# Patient Record
Sex: FEMALE | Race: WHITE | NOT HISPANIC OR LATINO | Employment: PART TIME | URBAN - METROPOLITAN AREA
[De-identification: names, ages, dates, MRNs, and addresses within clinical notes are randomized per-mention and may not be internally consistent; named-entity substitution may affect disease eponyms.]

---

## 2017-07-26 ENCOUNTER — ANESTHESIA - HEALTHEAST (OUTPATIENT)
Dept: OBGYN | Facility: CLINIC | Age: 27
End: 2017-07-26

## 2017-07-27 ENCOUNTER — HOME CARE/HOSPICE - HEALTHEAST (OUTPATIENT)
Dept: HOME HEALTH SERVICES | Facility: HOME HEALTH | Age: 27
End: 2017-07-27

## 2020-08-06 LAB
RPR - HISTORICAL: NORMAL
RUBELLA_EXT (HISTORICAL CONVERSION): NORMAL

## 2020-08-08 LAB
HBSAG_EXT (HISTORICAL CONVERSION): NEGATIVE
HGB_EXT (HISTORICAL CONVERSION): 12.1
HGB_EXT (HISTORICAL CONVERSION): 12.1
HIV_EXT: NEGATIVE
PLT_EXT - HISTORICAL: 220
RPR - HISTORICAL: NORMAL
RUBELLA_EXT (HISTORICAL CONVERSION): NORMAL

## 2021-01-25 LAB — GBS_EXT (HISTORICAL CONVERSION): NEGATIVE

## 2021-02-19 ENCOUNTER — AMBULATORY - HEALTHEAST (OUTPATIENT)
Dept: OBGYN | Facility: CLINIC | Age: 31
End: 2021-02-19

## 2021-02-19 DIAGNOSIS — O48.0 POST TERM PREGNANCY, ANTEPARTUM CONDITION OR COMPLICATION: ICD-10-CM

## 2021-02-19 DIAGNOSIS — O48.0 POST TERM PREGNANCY, ANTEPARTUM CONDITION OR COMPLICATION: Primary | ICD-10-CM

## 2021-02-19 LAB
SARS-COV-2 RNA RESP QL NAA+PROBE: NORMAL
SPECIMEN SOURCE: NORMAL

## 2021-02-19 PROCEDURE — U0003 INFECTIOUS AGENT DETECTION BY NUCLEIC ACID (DNA OR RNA); SEVERE ACUTE RESPIRATORY SYNDROME CORONAVIRUS 2 (SARS-COV-2) (CORONAVIRUS DISEASE [COVID-19]), AMPLIFIED PROBE TECHNIQUE, MAKING USE OF HIGH THROUGHPUT TECHNOLOGIES AS DESCRIBED BY CMS-2020-01-R: HCPCS | Performed by: OBSTETRICS & GYNECOLOGY

## 2021-02-19 PROCEDURE — U0005 INFEC AGEN DETEC AMPLI PROBE: HCPCS | Performed by: OBSTETRICS & GYNECOLOGY

## 2021-02-20 LAB
LABORATORY COMMENT REPORT: NORMAL
SARS-COV-2 RNA RESP QL NAA+PROBE: NEGATIVE
SPECIMEN SOURCE: NORMAL

## 2021-02-23 ENCOUNTER — ANESTHESIA - HEALTHEAST (OUTPATIENT)
Dept: OBGYN | Facility: CLINIC | Age: 31
End: 2021-02-23

## 2021-02-23 ENCOUNTER — HOSPITAL ENCOUNTER (INPATIENT)
Dept: OBGYN | Facility: CLINIC | Age: 31
Discharge: HOME | End: 2021-02-24
Attending: OBSTETRICS & GYNECOLOGY | Admitting: OBSTETRICS & GYNECOLOGY
Payer: COMMERCIAL

## 2021-02-23 LAB — HGB_EXT (HISTORICAL CONVERSION): 11.2

## 2021-02-23 ASSESSMENT — MIFFLIN-ST. JEOR: SCORE: 1782.76

## 2021-02-24 LAB
ABO/RH(D): NORMAL
COMPONENT (HISTORICAL CONVERSION): NORMAL
FBS KIT EXPIRATION DATE: NORMAL
FBS KIT LOT #: NORMAL
FETAL BLEED SCREEN: NORMAL
LOT NUMBER (HISTORICAL CONVERSION): NORMAL
STATUS (HISTORICAL CONVERSION): NORMAL
T PALLIDUM AB SER QL: NEGATIVE

## 2021-05-29 ENCOUNTER — HEALTH MAINTENANCE LETTER (OUTPATIENT)
Age: 31
End: 2021-05-29

## 2021-06-05 VITALS — HEIGHT: 67 IN | WEIGHT: 226 LBS | BODY MASS INDEX: 35.47 KG/M2

## 2021-06-12 NOTE — ANESTHESIA POSTPROCEDURE EVALUATION
Patient: Wendie Cunningham  * No procedures listed *  Anesthesia type: epidural    Patient location: Labor and Delivery  Last vitals:   Vitals:    07/27/17 0605   BP: 110/60   Pulse: 67   Resp:    Temp:    SpO2:      Post vital signs: stable  Level of consciousness: awake and responds to simple questions  Post-anesthesia pain: pain controlled  Post-anesthesia nausea and vomiting: no  Pulmonary: unassisted, return to baseline  Cardiovascular: stable and blood pressure at baseline  Hydration: adequate  Anesthetic events: no    QCDR Measures:  ASA# 11 - Christiane-op Cardiac Arrest: ASA11B - Patient did NOT experience unanticipated cardiac arrest  ASA# 12 - Christiane-op Mortality Rate: ASA12B - Patient did NOT die  ASA# 13 - PACU Re-Intubation Rate: NA - No ETT / LMA used for case  ASA# 10 - Composite Anes Safety: ASA10A - No serious adverse event  ASA# 38 - New Corneal Injury: ASA38A - No new exposure keratitis or corneal abrasion in PACU    Additional Notes:

## 2021-06-12 NOTE — ANESTHESIA PREPROCEDURE EVALUATION
Anesthesia Evaluation      Patient summary reviewed   No history of anesthetic complications     Airway   Mallampati: I  Neck ROM: full   Pulmonary - normal exam   (+) asthma  mild,  well controlled,                          Cardiovascular - negative ROS and normal exam  Exercise tolerance: > or = 10 METS  Rhythm: regular  Rate: normal,         Neuro/Psych - negative ROS     Endo/Other    (+) pregnant     GI/Hepatic/Renal - negative ROS           Dental - normal exam                        Anesthesia Plan  Planned anesthetic: epidural    ASA 2     Anesthetic plan and risks discussed with: patient and significant other    Post-op plan: routine recovery

## 2021-06-12 NOTE — ANESTHESIA PROCEDURE NOTES
Epidural Block    Patient location during procedure: OB  Time Called: 7/26/2017 4:32 PM  Reason for Block:labor epidural  Staffing:  Performing  Anesthesiologist: ANAHI GREWAL  Preanesthetic Checklist  Completed: patient identified, risks, benefits, and alternatives discussed, timeout performed, consent obtained, airway assessed, oxygen available, suction available, emergency drugs available and hand hygiene performed  Procedure  Patient position: right lateral decubitus  Prep: ChloraPrep  Patient monitoring: continuous pulse oximetry, heart rate and blood pressure  Approach: midline  Location: L1-L2  Injection technique: RUBY saline  Number of Attempts:1  Needle  Needle type: Joanne   Needle gauge: 18 G     Catheter in Space: 4  Assessment  Sensory level: T8  No complications

## 2021-06-15 NOTE — PLAN OF CARE
Problem: Pain  Goal: Patient's pain/discomfort is manageable  Outcome: Progressing     Problem: Vaginal Delivery - Recovery and Postpartum  Goal: Perineum intact without discharge or hematoma  Outcome: Progressing     Problem: Vaginal Delivery - Recovery and Postpartum  Goal: Patient vitals and physical assessment findings are stable following delivery  Outcome: Progressing     Problem: Vaginal Delivery - Recovery and Postpartum  Goal: Ambulates independently  Outcome: Progressing     Problem: Vaginal Delivery - Recovery and Postpartum  Goal: Empties bladder  Outcome: Progressing     Problem: Vaginal Delivery - Recovery and Postpartum  Goal: Patient demonstrates understanding of perineal care  Outcome: Progressing     Problem: Breast Feeding  Goal: Effective breast feeding established  Outcome: Progressing     Vital signs stable. Fundus firm, midline, U/1 with scant to light bleeding. Pain well managed with toradol. Patient ambulating independently. Up to bathroom, voided, and tubbed. Breastfeeding independently. Continue with current plan of care.

## 2021-06-15 NOTE — PROGRESS NOTES
"Labor progress note    S: Was more uncomfortable and received epidural bolus, then felt like pain improved.    O  Vitals: /66   Pulse 80   Temp 98.7  F (37.1  C) (Oral)   Resp 16   Ht 5' 7\" (1.702 m)   Wt (!) 226 lb (102.5 kg)   BMI 35.40 kg/m      SVE: 5.5/80/-1    FHT: Category 1  [place]: Every 2 min    A/P:  Wendie Cunningham is a 30 y.o.  at 39w6d here for elective IOL  -AROM to clear fluid    Anu Carpenter MD     "

## 2021-06-15 NOTE — PROGRESS NOTES
"Labor progress note    S: Epidural being placed.    O  Vitals: /72   Pulse 80   Temp 98.8  F (37.1  C) (Oral)   Resp 16   Ht 5' 7\" (1.702 m)   Wt (!) 226 lb (102.5 kg)   BMI 35.40 kg/m      SVE: /-1    FHT: Not tracing with [place]    A/P:  Wendie KARUNA StreetOctavio is a 30 y.o.  at 39w6d here for elective IOL.  -Pitocin to stop for now given tachysystole  -FSE applied.    Anu Carpenter MD     "

## 2021-06-15 NOTE — PROGRESS NOTES
Vaginal Delivery Postpartum Day 1    Patient Name:  Wendie Cunningham  :      1990  MRN:      [id number]      Assessment:  Normal postpartum course.    Plan:  Continue current care.    Subjective:  The patient feels well:  Voiding without difficulty, lochia normal, tolerating normal diet, and passing flatus.  Pain is well controlled with current medications.  The patient has no emotional concerns.  The baby is well and being fed by breast.    Objective:  Vitals:    21 1529   BP: 125/76   Pulse: 77   Resp: 16   Temp: 98  F (36.7  C)   SpO2: 99%     Patient Vitals for the past 24 hrs:   BP Temp Temp src Pulse Resp SpO2   21 1529 125/76 98  F (36.7  C) Oral 77 16 99 %   21 0745 117/56 98.4  F (36.9  C) Oral 67 16 99 %   21 0335 114/59 98.1  F (36.7  C) Oral 66 14 100 %   21 -- -- -- 92 -- 100 %   21 -- -- -- 84 -- 100 %   21 -- -- -- 79 -- 100 %   21 -- -- -- 81 -- 100 %   21 -- -- -- 86 -- 100 %   21 -- -- -- 85 -- 99 %   21 -- -- -- 96 -- 99 %   21 117/68 -- -- 80 16 --   21 -- -- -- 84 -- 98 %   21 -- -- -- 84 -- 98 %   21 124/59 -- -- 88 16 --   21 -- -- -- 91 -- 98 %   21 123/58 -- -- 82 16 98 %   21 -- -- -- 86 -- 98 %   21 118/54 -- -- 89 16 98 %   21 -- -- -- 89 -- 98 %   21 -- -- -- 81 -- 97 %   21 -- -- -- 82 -- 97 %   21 111/60 -- -- 83 16 97 %   21 -- -- -- 93 -- 93 %   21 -- -- -- 86 -- 94 %   21 130/60 -- -- 85 16 --   21 -- -- -- 86 -- 98 %   21 -- -- -- 90 -- 96 %   21 125/63 -- -- 92 16 --   21 -- -- -- 91 -- 96 %   21 -- -- -- 94 -- 96 %   21 -- -- -- 98 -- 96 %   21 116/59 -- -- 92 18 --   21 -- -- -- 95 -- 96 %   21 123/60 -- -- 90  16 --   21 -- -- -- 100 -- 96 %   21 -- -- -- 96 -- 97 %   21 -- -- -- 88 -- 100 %   21 -- -- -- 88 -- 100 %   21 -- -- -- 94 -- 100 %   21 113/73 -- -- 89 -- --   21 -- -- -- 87 -- 100 %   21 -- -- -- 89 -- 100 %   21 128/58 -- -- 93 16 --   21 -- 98.8  F (37.1  C) -- -- -- --   21 -- -- -- 90 -- 100 %       The amount and color of the lochia is appropriate for the duration of recovery.  The uterine fundus is firm.  Urinary output is adequate.     Prenatal Labs  Result Component Result Previous Result   ABO/Rh External Result O Negative (2016) No Results   ABORh O NEG (2021) O NEG (2017)   Hemoglobin External Result 11.2 (2021) 12.1 (2020)     12.1 (2020)   Rubella External Result Immune (2020) Immune (2020)       Immunization History   Administered Date(s) Administered     Hep B, historic 2003, 2003, 2003     MMR 2003     Tdap 2003, 2017       Provider:  ernesto    Date:  2021  Time:  8:45 PM    Patient Name:  Wendie Cunningham  :  1990  MRN:  [id number]

## 2021-06-15 NOTE — PROGRESS NOTES
[id number] working on getting belinda to trace FHT... was getting cx well so pit was decreased in half due to not break between cxs ... when Javi was in the room AROM she said pit could be adj as needed... while trying to trace FHT us FHT in 140s

## 2021-06-15 NOTE — PROGRESS NOTES
Outreach Note for [facility]    Wendie Cunningham  [id number]  1990    Discharge follow-up plan discussed with patient, needs assessed. Patient requests all follow-up through clinic/physician. Patient declines home care visit, unless medically indicated, and declines follow-up phone call.  No further needs identified at this time.    Completed by: Kimberly A Seipel, RN

## 2021-06-15 NOTE — PROGRESS NOTES
[id number] pt sitting for epid anest had a little longer time getting right spot.. unable to trace continues with belinda... aud FHTs ocacionally 140-150 and sometime picking up maternal in the 100's Dr bernardo on floor called in to asses and place FSE at 1719 and Ext  toco back on ... orders to stop pit at 1720 for an hour and re asses cervix than

## 2021-06-15 NOTE — H&P
OB Admission H&P     Date: 2021  Time: 8:47 AM   Wendie Cunningham, : 1990, MRN: [id number]     CC: Term gestation for elective induction    HPI: Wendie Cunningahm is a 30 y.o.  with  single inter-uterine gestation at 39w6d, with AB of Estimated Date of Delivery: 21. She presented to L&D with plan for induction .  Pregnancy has been complicated by None. Patient denies headache, visual changes, RUQ pain. She denies contractions, leakage of fluid, or vaginal bleeding and reports good fetal movement.     OB History   OB History    Para Term  AB Living   2             SAB TAB Ectopic Multiple Live Births                  # Outcome Date GA Lbr German/2nd Weight Sex Delivery Anes PTL Lv   2 Current            1                 Past Medical History:  Past Medical History:   Diagnosis Date     Anxiety     no meds     Asthma     10 years ago     Herpes     pt states cold sores        Past Surgical History:   Past Surgical History:   Procedure Laterality Date     WISDOM TOOTH EXTRACTION          Social History   Reviewed, patient denies smoking, alcohol and drug use      Medications  No current facility-administered medications on file prior to encounter.      Current Outpatient Medications on File Prior to Encounter   Medication Sig Dispense Refill     acetaminophen (TYLENOL) 325 MG tablet Take 1-2 tablets (325-650 mg total) by mouth every 4 (four) hours as needed.  0     ibuprofen (ADVIL,MOTRIN) 600 MG tablet Take 1 tablet (600 mg total) by mouth every 6 (six) hours as needed for pain. 30 tablet 0     ondansetron (ZOFRAN) 4 MG tablet Take 4 mg by mouth every 8 (eight) hours as needed for nausea.       prenatal vitamin iron-folic acid 27mg-0.8mg (PRENATAL S) 27 mg iron- 800 mcg Tab tablet Take 1 tablet by mouth daily.       valGANciclovir (VALCYTE) 450 mg tablet Take 450 mg by mouth daily.         Allergies   No Known Allergies    ROS: otherwise negative except what is stated in  HPI.     Physical Exam:   Vitals pending - Temp 98.1  F (36.7  C) (Oral)    Gen: no acute distress, resting comfortably   CV: acyanotic   Heart: regular rate and rhythm   Pulm: unlabored respirations, clear to ausculation bilaterally    Abd: gravid, soft, nontender   Extremities: soft, nontender   FHR: positive, catagory 1  [place]: no contractions  3cm in office  Pertinent Labs   Prenatal Labs  Result Component Result Previous Result   ABO/Rh External Result O Negative (12/14/2016) No Results   ABORh O NEG (7/26/2017) No Results   Hemoglobin External Result 11.1 (5/3/2017) 11.1 (12/14/2016)     12.7 (12/14/2016)   Rubella External Result Immune (12/14/2016) No Results        Impression:   single inter uterine pregnancy at term   Pregnancy complications include: None  For labor induction     Plan:   Induction of Labor anticipate Vaginal Delivery: Indication Elective        Reyes Green MD   [facility]  [phone]

## 2021-06-15 NOTE — DISCHARGE SUMMARY
OB Discharge Summary      Date:  2021    Name:  Wendie Cunningham  :  1990  MRN:  [id number]      Admission Date:  2021  Delivery Date:  2021   Gestational Age at Delivery:  39w6d  Discharge Date:  2021    Principal Diagnosis:    Patient Active Problem List   Diagnosis     Asthma     Alopecia     Acute Sinusitis     Herpes Simplex Type I     Urinary Tract Infection     Urinary Frequency     Vaginal delivery     Pregnant       Other Diagnosis:      Conditions complicating Pregnancy:  Other Complications/Significant Findings:  nl    Procedure(s) Performed:      Indication for :    Indication for Induction:       Condition at Discharge:  stable    Discharge Medications:    Wendie Cunningham   Home Medication Instructions ANTONINO:[id number]    Printed on:21   Medication Information                      acetaminophen (TYLENOL) 325 MG tablet  Take 1-2 tablets (325-650 mg total) by mouth every 4 (four) hours as needed.             ibuprofen (ADVIL,MOTRIN) 600 MG tablet  Take 1 tablet (600 mg total) by mouth every 6 (six) hours as needed for pain.             ondansetron (ZOFRAN) 4 MG tablet  Take 4 mg by mouth every 8 (eight) hours as needed for nausea.             prenatal vitamin iron-folic acid 27mg-0.8mg (PRENATAL S) 27 mg iron- 800 mcg Tab tablet  Take 1 tablet by mouth daily.             valGANciclovir (VALCYTE) 450 mg tablet  Take 450 mg by mouth daily.                 Discharge Plan:    Follow up with /JANAM:  ashley   Patient Instructions:      Physical activity: nothing per vagina    Diet:  reg    Medication:  motrin    Other:        Physician/CNM:  Farida Taylor    Name:  Wendie Cunningham  :  1990  MRN:  [id number]

## 2021-06-15 NOTE — PLAN OF CARE
Problem: Pain  Goal: Patient's pain/discomfort is manageable  Outcome: Progressing   Pain is less than or equal to 3 with oral pain meds

## 2021-06-15 NOTE — PROGRESS NOTES
"Labor progress note    S: Comfy with epidural in place    O  Vitals: /55   Pulse 89   Temp (P) 98.8  F (37.1  C) (Oral)   Resp 16   Ht 5' 7\" (1.702 m)   Wt (!) 226 lb (102.5 kg)   SpO2 95%   BMI 35.40 kg/m      SVE: 7/100/0    FHT: Category 1, reactive  [place]: Difficult to trace    A/P:  Wendie Cunningham is a 30 y.o.  at 39w6d here for elective IOL  -Pitocin augmentation with IUPC now in place.    Anu Carpenter MD     "

## 2021-06-15 NOTE — L&D DELIVERY NOTE
VAGINAL DELIVERY NOTE    PRE DELIVERY DIAGNOSIS  1) Intrauterine pregnancy at 39w6d   2) Elective induction      POST DELIVERY DIAGNOSIS  1) Intrauterine pregnancy at 39w6d   2) Delivery of a viable male.  3) Apgars: 8 and 9  4) weight: pending    Delivery Method/Type:       PROVIDER:   Niki Carpenter     ANESTHESIA: Epidural    COMPLICATIONS: None    DESCRIPTION OF PROCEDURE  Wendie Cunningham is a 30 y.o.  with prenatal care with Dr. Dorsey who was admitted at 39w6d for elective induction. Patient had Pitocin then AROM. She received an epidural. Patient had a .  Delayed cord clamping performed.  No gross fetal defects were observed. Pitocin was administered. Placenta delivered intact with 3 vessel cord. The perineum was then evaluated and intact. Delivery QBL (mL): 100     Niki Carpenter

## 2021-06-15 NOTE — LACTATION NOTE
This note was copied from a baby's chart.  Met with Wendie to see how breast feeding is going.  This is her second baby, but breast feeding didn't go well with her first.  This baby has been latching and feeding well since birth.  She denies nipple pain,she hears swallows and he's meeting his output goals.  We discussed feeding baby 8-10 times in 24 hours, offering both breasts every feeding and make sure he meets his output goals for days of life.  We also discussed lactation resources in education folder; op lactation clinic, online resources and ECFE Virtual class.  She has a Spectra and Medela breast pump.  I gave her the cheat sheet with QRS code to watch videos on use and cycle setting guidelines for beginner pumping.  Will follow up as needed.

## 2021-06-15 NOTE — ANESTHESIA PROCEDURE NOTES
Epidural Block    Patient location during procedure: OB  Time Called: 2/23/2021 4:26 PM  Reason for Block:labor epidural  Staffing:  Performing  Anesthesiologist: Ivett Lindsay MD  Preanesthetic Checklist  Completed: patient identified, risks, benefits, and alternatives discussed, timeout performed, consent obtained, airway assessed, oxygen available, suction available, emergency drugs available and hand hygiene performed  Procedure  Patient position: sitting  Prep: ChloraPrep  Patient monitoring: continuous pulse oximetry, heart rate and blood pressure  Approach: midline  Location: L3-L4  Injection technique: RUBY saline  Number of Attempts:2  Needle  Needle type: Tuohy   Needle gauge: 18 G     Catheter in Space: 5  Assessment  Sensory level:  No complications      Additional Notes:  R/B/A discussed with pt who wished to proceed. Two attemps for crisp RUBY. Negative for heme. No parasthesias. Catheter threaded easily. Pt tolerated procedure well. No complications.

## 2021-06-15 NOTE — ANESTHESIA PREPROCEDURE EVALUATION
Anesthesia Evaluation      Patient summary reviewed     Airway   Mallampati: II  Neck ROM: full   Pulmonary - negative ROS and normal exam   (+) asthma                           Cardiovascular - negative ROS and normal exam   Neuro/Psych - negative ROS     Endo/Other    (+) obesity, pregnant     GI/Hepatic/Renal - negative ROS           Dental - normal exam                        Anesthesia Plan  Planned anesthetic: epidural    ASA 2     Anesthetic plan and risks discussed with: patient    Post-op plan: routine recovery

## 2021-06-15 NOTE — PLAN OF CARE
Problem: Pain  Goal: Patient's pain/discomfort is manageable  2/24/2021 2145 by Dee Laird RN  Outcome: Adequate for Discharge  2/24/2021 2145 by Dee Laird RN  Outcome: Adequate for Discharge     Problem: Vaginal Delivery - Recovery and Postpartum  Goal: Patient vitals and physical assessment findings are stable following delivery  2/24/2021 2145 by Dee Laird RN  Outcome: Adequate for Discharge  2/24/2021 2145 by Dee Laird RN  Outcome: Adequate for Discharge  Goal: Perineum intact without discharge or hematoma  2/24/2021 2145 by Dee Laird RN  Outcome: Adequate for Discharge  2/24/2021 2145 by Dee Laird RN  Outcome: Adequate for Discharge  Goal: Empties bladder  2/24/2021 2145 by Dee Laird RN  Outcome: Adequate for Discharge  2/24/2021 2145 by Dee Laird RN  Outcome: Adequate for Discharge  Goal: Patient will resume normal bowel function  2/24/2021 2145 by Dee Laird RN  Outcome: Adequate for Discharge  2/24/2021 2145 by Dee Laird RN  Outcome: Adequate for Discharge  Goal: Ambulates independently  2/24/2021 2145 by Dee Laird RN  Outcome: Adequate for Discharge  2/24/2021 2145 by Dee Laird RN  Outcome: Adequate for Discharge  Goal: Patient demonstrates understanding of perineal care  2/24/2021 2145 by Dee Laird RN  Outcome: Adequate for Discharge  2/24/2021 2145 by Dee Laird RN  Outcome: Adequate for Discharge     Problem: Breast Feeding  Goal: Breasts are soft with nipple integrity intact  2/24/2021 2145 by Dee Laird RN  Outcome: Adequate for Discharge  2/24/2021 2145 by Dee Laird RN  Outcome: Adequate for Discharge  Goal: Effective breast feeding established  2/24/2021 2145 by Dee Laird RN  Outcome: Adequate for Discharge  2/24/2021 2145 by Dee Laird RN  Outcome: Adequate for Discharge     Problem: Parenting/Coping  Goal:  Patient/family/caregiver demonstrates positive interactions and parenting skills with baby  2/24/2021 2145 by Dee Laird, RN  Outcome: Adequate for Discharge  2/24/2021 2145 by Dee Laird, RN  Outcome: Adequate for Discharge

## 2021-06-15 NOTE — ANESTHESIA POSTPROCEDURE EVALUATION
Patient: Wendie Cunningham  * No procedures listed *  Anesthesia type: No value filed.    Patient location: Labor and Delivery  Last vitals: No vitals data found for the desired time range.    Post vital signs: stable  Level of consciousness: awake and responds to simple questions  Post-anesthesia pain: pain controlled  Post-anesthesia nausea and vomiting: no  Pulmonary: unassisted, return to baseline  Cardiovascular: stable and blood pressure at baseline  Hydration: adequate  Anesthetic events: no    QCDR Measures:  ASA# 11 - Christiane-op Cardiac Arrest: ASA11B - Patient did NOT experience unanticipated cardiac arrest  ASA# 12 - Christiane-op Mortality Rate: ASA12B - Patient did NOT die  ASA# 13 - PACU Re-Intubation Rate: ASA13B - Patient did NOT require a new airway mgmt  ASA# 10 - Composite Anes Safety: ASA10A - No serious adverse event    Additional Notes:  . Pain well controlled. Denies headache, nausea/vomiting. Pt ambulating and voiding with no issues. Motor function returned to baseline. No apparent anesthesia related complications. All questions and concerns answered.

## 2021-06-16 PROBLEM — Z34.90 PREGNANT: Status: ACTIVE | Noted: 2021-02-23

## 2021-07-04 NOTE — ADDENDUM NOTE
Addendum Note by Juan Antonio Aguilar at 2/19/2021 12:53 PM     Author: Juan Antonio Aguilar Service: -- Author Type:     Filed: 2/19/2021  3:22 PM Encounter Date: 2/19/2021 Status: Signed    : Juan Antonio Aguilar ()    Addended by: JUAN ANTONIO AGUILAR on: 2/19/2021 03:22 PM        Modules accepted: Orders

## 2021-07-14 PROBLEM — Z34.90 PREGNANT: Status: RESOLVED | Noted: 2017-07-26 | Resolved: 2017-07-27

## 2021-09-18 ENCOUNTER — HEALTH MAINTENANCE LETTER (OUTPATIENT)
Age: 31
End: 2021-09-18

## 2022-06-19 ENCOUNTER — HEALTH MAINTENANCE LETTER (OUTPATIENT)
Age: 32
End: 2022-06-19

## 2022-11-19 ENCOUNTER — HEALTH MAINTENANCE LETTER (OUTPATIENT)
Age: 32
End: 2022-11-19

## 2023-07-02 ENCOUNTER — HEALTH MAINTENANCE LETTER (OUTPATIENT)
Age: 33
End: 2023-07-02